# Patient Record
Sex: MALE | Race: BLACK OR AFRICAN AMERICAN | NOT HISPANIC OR LATINO | Employment: OTHER | URBAN - METROPOLITAN AREA
[De-identification: names, ages, dates, MRNs, and addresses within clinical notes are randomized per-mention and may not be internally consistent; named-entity substitution may affect disease eponyms.]

---

## 2023-03-21 LAB
ALANINE AMINOTRANSFERASE (SGPT) (U/L) IN SER/PLAS: 9 U/L (ref 10–52)
ALBUMIN (G/DL) IN SER/PLAS: 4 G/DL (ref 3.4–5)
ALKALINE PHOSPHATASE (U/L) IN SER/PLAS: 84 U/L (ref 33–120)
ANION GAP IN SER/PLAS: 14 MMOL/L (ref 10–20)
ASPARTATE AMINOTRANSFERASE (SGOT) (U/L) IN SER/PLAS: 17 U/L (ref 9–39)
BASOPHILS (10*3/UL) IN BLOOD BY AUTOMATED COUNT: 0.03 X10E9/L (ref 0–0.1)
BASOPHILS/100 LEUKOCYTES IN BLOOD BY AUTOMATED COUNT: 0.3 % (ref 0–2)
BILIRUBIN TOTAL (MG/DL) IN SER/PLAS: 0.5 MG/DL (ref 0–1.2)
CALCIUM (MG/DL) IN SER/PLAS: 8.9 MG/DL (ref 8.6–10.6)
CARBON DIOXIDE, TOTAL (MMOL/L) IN SER/PLAS: 30 MMOL/L (ref 21–32)
CHLORIDE (MMOL/L) IN SER/PLAS: 102 MMOL/L (ref 98–107)
CHOLESTEROL (MG/DL) IN SER/PLAS: 140 MG/DL (ref 0–199)
CHOLESTEROL IN HDL (MG/DL) IN SER/PLAS: 46.9 MG/DL
CHOLESTEROL/HDL RATIO: 3
CREATININE (MG/DL) IN SER/PLAS: 0.82 MG/DL (ref 0.5–1.3)
EOSINOPHILS (10*3/UL) IN BLOOD BY AUTOMATED COUNT: 0.16 X10E9/L (ref 0–0.7)
EOSINOPHILS/100 LEUKOCYTES IN BLOOD BY AUTOMATED COUNT: 1.6 % (ref 0–6)
ERYTHROCYTE DISTRIBUTION WIDTH (RATIO) BY AUTOMATED COUNT: 15.1 % (ref 11.5–14.5)
ERYTHROCYTE MEAN CORPUSCULAR HEMOGLOBIN CONCENTRATION (G/DL) BY AUTOMATED: 30.7 G/DL (ref 32–36)
ERYTHROCYTE MEAN CORPUSCULAR VOLUME (FL) BY AUTOMATED COUNT: 85 FL (ref 80–100)
ERYTHROCYTES (10*6/UL) IN BLOOD BY AUTOMATED COUNT: 5.13 X10E12/L (ref 4.5–5.9)
ESTIMATED AVERAGE GLUCOSE FOR HBA1C: 105 MG/DL
GFR MALE: >90 ML/MIN/1.73M2
GLUCOSE (MG/DL) IN SER/PLAS: 82 MG/DL (ref 74–99)
HEMATOCRIT (%) IN BLOOD BY AUTOMATED COUNT: 43.7 % (ref 41–52)
HEMOGLOBIN (G/DL) IN BLOOD: 13.4 G/DL (ref 13.5–17.5)
HEMOGLOBIN A1C/HEMOGLOBIN TOTAL IN BLOOD: 5.3 %
IMMATURE GRANULOCYTES/100 LEUKOCYTES IN BLOOD BY AUTOMATED COUNT: 0.2 % (ref 0–0.9)
LDL: 77 MG/DL (ref 0–99)
LEUKOCYTES (10*3/UL) IN BLOOD BY AUTOMATED COUNT: 10.2 X10E9/L (ref 4.4–11.3)
LYMPHOCYTES (10*3/UL) IN BLOOD BY AUTOMATED COUNT: 3.26 X10E9/L (ref 1.2–4.8)
LYMPHOCYTES/100 LEUKOCYTES IN BLOOD BY AUTOMATED COUNT: 31.9 % (ref 13–44)
MONOCYTES (10*3/UL) IN BLOOD BY AUTOMATED COUNT: 0.74 X10E9/L (ref 0.1–1)
MONOCYTES/100 LEUKOCYTES IN BLOOD BY AUTOMATED COUNT: 7.2 % (ref 2–10)
NEUTROPHILS (10*3/UL) IN BLOOD BY AUTOMATED COUNT: 6.02 X10E9/L (ref 1.2–7.7)
NEUTROPHILS/100 LEUKOCYTES IN BLOOD BY AUTOMATED COUNT: 58.8 % (ref 40–80)
NRBC (PER 100 WBCS) BY AUTOMATED COUNT: 0 /100 WBC (ref 0–0)
PLATELETS (10*3/UL) IN BLOOD AUTOMATED COUNT: 244 X10E9/L (ref 150–450)
POTASSIUM (MMOL/L) IN SER/PLAS: 3.8 MMOL/L (ref 3.5–5.3)
PROTEIN TOTAL: 7.8 G/DL (ref 6.4–8.2)
SODIUM (MMOL/L) IN SER/PLAS: 142 MMOL/L (ref 136–145)
TRIGLYCERIDE (MG/DL) IN SER/PLAS: 80 MG/DL (ref 0–149)
UREA NITROGEN (MG/DL) IN SER/PLAS: 7 MG/DL (ref 6–23)
VLDL: 16 MG/DL (ref 0–40)

## 2023-03-22 NOTE — RESULT ENCOUNTER NOTE
Reviewed patient's results (cbc, cmp, a1c, lipids)    Labs were good    He may be due for annual well exam.  Please check and schedule. Thanks

## 2023-04-25 LAB
ALANINE AMINOTRANSFERASE (SGPT) (U/L) IN SER/PLAS: 15 U/L (ref 10–52)
ALBUMIN (G/DL) IN SER/PLAS: 4.4 G/DL (ref 3.4–5)
ALKALINE PHOSPHATASE (U/L) IN SER/PLAS: 84 U/L (ref 33–120)
ANION GAP IN SER/PLAS: 15 MMOL/L (ref 10–20)
ASPARTATE AMINOTRANSFERASE (SGOT) (U/L) IN SER/PLAS: 24 U/L (ref 9–39)
BILIRUBIN TOTAL (MG/DL) IN SER/PLAS: 0.4 MG/DL (ref 0–1.2)
CALCIUM (MG/DL) IN SER/PLAS: 9.2 MG/DL (ref 8.6–10.6)
CARBON DIOXIDE, TOTAL (MMOL/L) IN SER/PLAS: 28 MMOL/L (ref 21–32)
CHLORIDE (MMOL/L) IN SER/PLAS: 102 MMOL/L (ref 98–107)
CREATININE (MG/DL) IN SER/PLAS: 1.02 MG/DL (ref 0.5–1.3)
GFR MALE: >90 ML/MIN/1.73M2
GLUCOSE (MG/DL) IN SER/PLAS: 73 MG/DL (ref 74–99)
POTASSIUM (MMOL/L) IN SER/PLAS: 4.2 MMOL/L (ref 3.5–5.3)
PROTEIN TOTAL: 7.9 G/DL (ref 6.4–8.2)
SODIUM (MMOL/L) IN SER/PLAS: 141 MMOL/L (ref 136–145)
TRIGLYCERIDE (MG/DL) IN SER/PLAS: 68 MG/DL (ref 0–149)
UREA NITROGEN (MG/DL) IN SER/PLAS: 8 MG/DL (ref 6–23)

## 2023-11-28 ENCOUNTER — HOSPITAL ENCOUNTER (EMERGENCY)
Facility: HOSPITAL | Age: 28
Discharge: HOME | End: 2023-11-28
Payer: MEDICAID

## 2023-11-28 VITALS
TEMPERATURE: 99 F | OXYGEN SATURATION: 97 % | HEIGHT: 74 IN | DIASTOLIC BLOOD PRESSURE: 74 MMHG | BODY MASS INDEX: 19.25 KG/M2 | RESPIRATION RATE: 16 BRPM | WEIGHT: 150 LBS | SYSTOLIC BLOOD PRESSURE: 130 MMHG

## 2023-11-28 DIAGNOSIS — F89 DEVELOPMENTAL DISABILITY: Primary | ICD-10-CM

## 2023-11-28 PROCEDURE — 93005 ELECTROCARDIOGRAM TRACING: CPT

## 2023-11-28 PROCEDURE — 99284 EMERGENCY DEPT VISIT MOD MDM: CPT | Performed by: PHYSICIAN ASSISTANT

## 2023-11-28 PROCEDURE — 99283 EMERGENCY DEPT VISIT LOW MDM: CPT

## 2023-11-28 ASSESSMENT — PAIN DESCRIPTION - DESCRIPTORS: DESCRIPTORS: ACHING

## 2023-11-28 ASSESSMENT — PAIN SCALES - GENERAL: PAINLEVEL_OUTOF10: 2

## 2023-11-28 ASSESSMENT — COLUMBIA-SUICIDE SEVERITY RATING SCALE - C-SSRS
2. HAVE YOU ACTUALLY HAD ANY THOUGHTS OF KILLING YOURSELF?: NO
6. HAVE YOU EVER DONE ANYTHING, STARTED TO DO ANYTHING, OR PREPARED TO DO ANYTHING TO END YOUR LIFE?: NO
1. IN THE PAST MONTH, HAVE YOU WISHED YOU WERE DEAD OR WISHED YOU COULD GO TO SLEEP AND NOT WAKE UP?: NO

## 2023-11-28 ASSESSMENT — PAIN DESCRIPTION - PAIN TYPE: TYPE: ACUTE PAIN

## 2023-11-28 ASSESSMENT — PAIN DESCRIPTION - LOCATION: LOCATION: CHEST

## 2023-11-28 ASSESSMENT — PAIN - FUNCTIONAL ASSESSMENT: PAIN_FUNCTIONAL_ASSESSMENT: 0-10

## 2023-11-28 NOTE — ED PROVIDER NOTES
HPI   Chief Complaint   Patient presents with    Chest Pain     CP x few days        This is a 28-year-old male with a past medical history of developmental delay, cerebral palsy, MRDD who presents reporting chest pain to triage.  Upon my encounter, the patient has no complaints.  Denies any pain or discomfort.  He denies chest pain, shortness of breath, cough, sore throat, nausea, vomiting, abdominal pain.  According to records, it appears the patient has a habit of leaving his home unbeknownst to his mother and going to different emergency departments.                           No data recorded                Patient History   History reviewed. No pertinent past medical history.  Past Surgical History:   Procedure Laterality Date    CATARACT EXTRACTION  02/04/2016    Cataract Surgery     No family history on file.  Social History     Tobacco Use    Smoking status: Not on file    Smokeless tobacco: Not on file   Substance Use Topics    Alcohol use: Not on file    Drug use: Not on file       Physical Exam   ED Triage Vitals [11/28/23 1342]   Temp Pulse Resp BP   37.2 °C (99 °F) -- 16 130/74      SpO2 Temp src Heart Rate Source Patient Position   97 % -- Monitor Sitting      BP Location FiO2 (%)     Right arm --       Physical Exam  Vitals reviewed.   Constitutional:       Appearance: He is well-developed.   Eyes:      Extraocular Movements: Extraocular movements intact.      Pupils: Pupils are equal, round, and reactive to light.   Cardiovascular:      Rate and Rhythm: Normal rate and regular rhythm.   Pulmonary:      Effort: Pulmonary effort is normal.   Abdominal:      Palpations: Abdomen is soft.      Tenderness: There is no abdominal tenderness.   Musculoskeletal:         General: Normal range of motion.   Neurological:      Mental Status: He is alert.         ED Course & MDM   Diagnoses as of 11/28/23 1414   Developmental disability       Medical Decision Making  28-year-old male is presenting with reported  chest pain to triage, though denies this for me.  He is asymptomatic at this time, is requesting a bus pass.  He is hemodynamically stable.    I called his mother, Patricia, at [phone], who states she has been looking for her son and called the police.  She will come and pick him up.    No indications for any further workup.  EKG from triage was performed revealing normal sinus rhythm rate 92 bpm, normal axis, MN interval 142, QTc 413, without any contiguous ST-T wave abnormalities.      The patient was discharged in stable condition with a ride from his mother.        Procedure  Procedures     Jose Mullins PA-C  11/28/23 1414

## 2023-11-29 ENCOUNTER — ANCILLARY PROCEDURE (OUTPATIENT)
Dept: EMERGENCY MEDICINE | Facility: HOSPITAL | Age: 28
End: 2023-11-29
Payer: MEDICAID

## 2023-11-29 LAB
ATRIAL RATE: 92 BPM
P AXIS: 78 DEGREES
P OFFSET: 203 MS
P ONSET: 148 MS
PR INTERVAL: 142 MS
Q ONSET: 219 MS
QRS COUNT: 16 BEATS
QRS DURATION: 86 MS
QT INTERVAL: 334 MS
QTC CALCULATION(BAZETT): 413 MS
QTC FREDERICIA: 385 MS
R AXIS: 78 DEGREES
T AXIS: 60 DEGREES
T OFFSET: 386 MS
VENTRICULAR RATE: 92 BPM

## 2024-03-03 ENCOUNTER — HOSPITAL ENCOUNTER (EMERGENCY)
Facility: HOSPITAL | Age: 29
Discharge: HOME | End: 2024-03-03
Attending: EMERGENCY MEDICINE
Payer: MEDICAID

## 2024-03-03 VITALS
SYSTOLIC BLOOD PRESSURE: 121 MMHG | WEIGHT: 145 LBS | HEIGHT: 69 IN | TEMPERATURE: 98.4 F | HEART RATE: 86 BPM | DIASTOLIC BLOOD PRESSURE: 74 MMHG | BODY MASS INDEX: 21.48 KG/M2 | RESPIRATION RATE: 16 BRPM | OXYGEN SATURATION: 98 %

## 2024-03-03 DIAGNOSIS — J34.89 NASAL PAIN: Primary | ICD-10-CM

## 2024-03-03 PROCEDURE — 99283 EMERGENCY DEPT VISIT LOW MDM: CPT

## 2024-03-03 PROCEDURE — 99283 EMERGENCY DEPT VISIT LOW MDM: CPT | Performed by: EMERGENCY MEDICINE

## 2024-03-03 RX ORDER — ACETAMINOPHEN 325 MG/1
975 TABLET ORAL ONCE
Status: COMPLETED | OUTPATIENT
Start: 2024-03-03 | End: 2024-03-03

## 2024-03-03 RX ADMIN — ACETAMINOPHEN 975 MG: 325 TABLET ORAL at 17:23

## 2024-03-03 ASSESSMENT — PAIN SCALES - GENERAL: PAINLEVEL_OUTOF10: 4

## 2024-03-03 ASSESSMENT — COLUMBIA-SUICIDE SEVERITY RATING SCALE - C-SSRS
6. HAVE YOU EVER DONE ANYTHING, STARTED TO DO ANYTHING, OR PREPARED TO DO ANYTHING TO END YOUR LIFE?: NO
2. HAVE YOU ACTUALLY HAD ANY THOUGHTS OF KILLING YOURSELF?: NO
1. IN THE PAST MONTH, HAVE YOU WISHED YOU WERE DEAD OR WISHED YOU COULD GO TO SLEEP AND NOT WAKE UP?: NO

## 2024-03-03 ASSESSMENT — PAIN DESCRIPTION - LOCATION: LOCATION: FINGER (COMMENT WHICH ONE)

## 2024-03-03 ASSESSMENT — PAIN - FUNCTIONAL ASSESSMENT: PAIN_FUNCTIONAL_ASSESSMENT: 0-10

## 2024-03-03 NOTE — ED TRIAGE NOTES
Patient arrives to the ED via [facility] d/t nose pain. EMS was called by police due to patient waving them down and stating that his nose hurt. Patient states he stumbled into a pole on the bus and injured his nose. Patient does complain of a headache, but denies light headedness and dizziness. Patient denies any other pain.

## 2024-03-03 NOTE — ED PROVIDER NOTES
I performed a history and physical examination of Sriram Zhao and discussed his management with Dr. Decker.  I agree with the history, physical, assessment, and plan of care, with the following exceptions: None    I was present for the following procedures: None  Time Spent in Critical Care of the patient: None  Time spent in discussions with the patient and family: 15    28-year-old male with a history of developmental delay who was brought in by EMS for a fall.  It is unclear of the mechanism of the fall.  Per EMS, patient flagged down a police because he fell into a pole.  Unsure LOC.  Patient is not on anticoagulants.  His brother who is one of his caregivers came in.  He states the patient has a fascination with police and EMS and he often comes to the hospital in order to interact with them.  On my exam patient had no visible head trauma, no visible injury to his nose or face, midface was stable.  Cranial nerves intact.  I did discuss CT face and head with patient's caregiver at length, he also spoke to the mother.  Since we are unsure what happened today, we could get the CT scans to rule out any acute traumatic injuries although exam here is reassuring.  Patient caregivers specifically mother and sibling decided to hold off.  Will observe at home.     Champ Thorpe MD MPH       Champ Thorpe MD MPH  03/09/24 [id number]

## 2024-03-03 NOTE — ED PROVIDER NOTES
HPI:      Limitations to History: Intellectual disability   Additional History Obtained from: Brother  External records reviewed: prior EMR notes     Chief Complaint   Patient presents with    Fall          Sriram Zhao is a 28 y.o. male with past medical history of developmental delay/intellectual disability, and autism presenting to the ED with nose pain.  Per EMS in triage, patient flagged down police because he stumbled into a pool and his nose hurt.  On my interview, denied any pain or tenderness to palpation, however from triage complaint of headache.  Denies any other symptoms such as fevers, chills, lightheadedness, dizziness, facial pain, nosebleeds, sore throat, or any nausea or vomiting.       No past medical history on file.  Past Surgical History:   Procedure Laterality Date    CATARACT EXTRACTION  02/04/2016    Cataract Surgery        Not on File    --------------------------------------------------------------------------------------------------------------------------------------    VS: As documented in the triage note and EMR flowsheet from this visit were reviewed.  Temp 36.9 °C (98.4 °F) HR 86 /74 RR 16 Sat 98 % on      Physical Exam:  GEN:  no acute distress, appears comfortable. Conversational and appropriate.    HEENT: Normocephalic, atraumatic. Conjunctiva pink with no redness or exudates. Hearing grossly intact. Moist mucous membranes.  Nontender nasal bone to palpation, without any obvious deformity, nasal bleeding, or bruising of the forehead or nose.  CARDIO: Normal rate and regular rhythm. Normal S1, S2  without murmurs, rubs, or gallops.   PULM: Clear to auscultation bilaterally. No rales, rhonchi, or wheezes. No accessory muscle use or stridor. Speaking in full sentences.  GI: Soft, non-tender, non-distended. No rebound tenderness or guarding.   SKIN: Warm and dry, no rashes, lesions, petechiae, or purpura.  MSK: ROM intact in all 4 extremities without contractures or pain. No  peripheral edema, contusions, or wounds.    NEURO: A&Ox3, No focal findings identified. No confusion or gross mental status changes.  PSYCH: Appropriate mood and behavior, converses and responds appropriately during exam.        ---------------------------------------------------------------------------------------------------------------------------------------    Given in the ED:   Medications   acetaminophen (Tylenol) tablet 975 mg (975 mg oral Given 3/3/24 1723)      Work up: All labs and imaging were independently reviewed by me.    Labs Reviewed - No data to display    No orders to display       Diagnoses as of 03/09/24 0002   Nasal pain       MDM:  Briefly, this is a 28 y.o. male with past medical history of developmental delay/intellectual disability, and autism presenting to the ED with nose pain without any acute injury on examination.  On further assessment, mother was present at bedside, noting that his younger brother often leaves the house, gets on the bus, and is found at the hospital.  Brother notes at baseline currently.  Also discussed with mother, who is agreeable to discharge home given that his exam was unremarkable.  Discussed with patient, advised to  use ice as needed.  Discharged in stable condition, ambulatory without any difficulty.    Impression:   1. Nasal pain        Plan and Disposition: discharge home, advised to Return if worse. They understand return precautions and discharge instructions. Patient was in agreement with this plan.        Jessie Thompson DO  Emergency Medicine, PGY-2    Patient was seen and evaluated by the attending physician. The attending ED physician agrees with the plan. Patient and/or patient´s representative was counseled regarding labs, imaging, likely diagnosis, and plan. All questions were answered.  Disclaimer: This note was dictated by speech recognition.  Attempt at proofreading was made to minimize errors.  Errors in transcription may be present.   Please call if questions.     Jessie Decker, DO  Resident  03/09/24 0003